# Patient Record
Sex: MALE | Race: ASIAN | NOT HISPANIC OR LATINO | URBAN - METROPOLITAN AREA
[De-identification: names, ages, dates, MRNs, and addresses within clinical notes are randomized per-mention and may not be internally consistent; named-entity substitution may affect disease eponyms.]

---

## 2018-04-06 ENCOUNTER — EMERGENCY (EMERGENCY)
Facility: HOSPITAL | Age: 48
LOS: 1 days | Discharge: HOME | End: 2018-04-06
Attending: EMERGENCY MEDICINE | Admitting: EMERGENCY MEDICINE
Payer: COMMERCIAL

## 2018-04-06 VITALS
SYSTOLIC BLOOD PRESSURE: 137 MMHG | DIASTOLIC BLOOD PRESSURE: 87 MMHG | OXYGEN SATURATION: 100 % | TEMPERATURE: 99 F | RESPIRATION RATE: 16 BRPM | HEART RATE: 86 BPM

## 2018-04-06 LAB
ALBUMIN SERPL ELPH-MCNC: 4.2 G/DL — SIGNIFICANT CHANGE UP (ref 3.3–5)
ALP SERPL-CCNC: 69 U/L — SIGNIFICANT CHANGE UP (ref 40–120)
ALT FLD-CCNC: 23 U/L — SIGNIFICANT CHANGE UP (ref 4–41)
AST SERPL-CCNC: 21 U/L — SIGNIFICANT CHANGE UP (ref 4–40)
BASE EXCESS BLDV CALC-SCNC: 2.4 MMOL/L — SIGNIFICANT CHANGE UP
BASOPHILS # BLD AUTO: 0.02 K/UL — SIGNIFICANT CHANGE UP (ref 0–0.2)
BASOPHILS NFR BLD AUTO: 0.4 % — SIGNIFICANT CHANGE UP (ref 0–2)
BILIRUB SERPL-MCNC: 0.5 MG/DL — SIGNIFICANT CHANGE UP (ref 0.2–1.2)
BLOOD GAS VENOUS - CREATININE: 0.97 MG/DL — SIGNIFICANT CHANGE UP (ref 0.5–1.3)
BUN SERPL-MCNC: 16 MG/DL — SIGNIFICANT CHANGE UP (ref 7–23)
CALCIUM SERPL-MCNC: 9 MG/DL — SIGNIFICANT CHANGE UP (ref 8.4–10.5)
CHLORIDE BLDV-SCNC: 102 MMOL/L — SIGNIFICANT CHANGE UP (ref 96–108)
CHLORIDE SERPL-SCNC: 98 MMOL/L — SIGNIFICANT CHANGE UP (ref 98–107)
CO2 SERPL-SCNC: 25 MMOL/L — SIGNIFICANT CHANGE UP (ref 22–31)
COHGB MFR BLDV: 0.6 % — SIGNIFICANT CHANGE UP (ref 0.5–1.5)
COHGB MFR BLDV: 14.8 G/DL — SIGNIFICANT CHANGE UP (ref 13–17)
CREAT SERPL-MCNC: 1.07 MG/DL — SIGNIFICANT CHANGE UP (ref 0.5–1.3)
EOSINOPHIL # BLD AUTO: 0.07 K/UL — SIGNIFICANT CHANGE UP (ref 0–0.5)
EOSINOPHIL NFR BLD AUTO: 1.5 % — SIGNIFICANT CHANGE UP (ref 0–6)
GAS PNL BLDV: 134 MMOL/L — LOW (ref 136–146)
GLUCOSE BLDV-MCNC: 147 — HIGH (ref 70–99)
GLUCOSE SERPL-MCNC: 153 MG/DL — HIGH (ref 70–99)
HCO3 BLDV-SCNC: 25 MMOL/L — SIGNIFICANT CHANGE UP (ref 20–27)
HCT VFR BLD CALC: 42.3 % — SIGNIFICANT CHANGE UP (ref 39–50)
HCT VFR BLDV CALC: 45.3 % — SIGNIFICANT CHANGE UP (ref 39–51)
HGB BLD-MCNC: 14.2 G/DL — SIGNIFICANT CHANGE UP (ref 13–17)
HGB BLDV-MCNC: 14.8 G/DL — SIGNIFICANT CHANGE UP (ref 13–17)
IMM GRANULOCYTES # BLD AUTO: 0.01 # — SIGNIFICANT CHANGE UP
IMM GRANULOCYTES NFR BLD AUTO: 0.2 % — SIGNIFICANT CHANGE UP (ref 0–1.5)
LACTATE BLDV-MCNC: 1.2 MMOL/L — SIGNIFICANT CHANGE UP (ref 0.5–2)
LYMPHOCYTES # BLD AUTO: 0.93 K/UL — LOW (ref 1–3.3)
LYMPHOCYTES # BLD AUTO: 20.3 % — SIGNIFICANT CHANGE UP (ref 13–44)
MCHC RBC-ENTMCNC: 28.3 PG — SIGNIFICANT CHANGE UP (ref 27–34)
MCHC RBC-ENTMCNC: 33.6 % — SIGNIFICANT CHANGE UP (ref 32–36)
MCV RBC AUTO: 84.3 FL — SIGNIFICANT CHANGE UP (ref 80–100)
METHGB MFR BLDV: 0.6 % — SIGNIFICANT CHANGE UP (ref 0–1.5)
MONOCYTES # BLD AUTO: 0.51 K/UL — SIGNIFICANT CHANGE UP (ref 0–0.9)
MONOCYTES NFR BLD AUTO: 11.1 % — SIGNIFICANT CHANGE UP (ref 2–14)
NEUTROPHILS # BLD AUTO: 3.05 K/UL — SIGNIFICANT CHANGE UP (ref 1.8–7.4)
NEUTROPHILS NFR BLD AUTO: 66.5 % — SIGNIFICANT CHANGE UP (ref 43–77)
NRBC # FLD: 0 — SIGNIFICANT CHANGE UP
OXYHGB MFR BLDV: 54.2 % — LOW (ref 59–84)
PCO2 BLDV: 46 MMHG — SIGNIFICANT CHANGE UP (ref 41–51)
PH BLDV: 7.39 PH — SIGNIFICANT CHANGE UP (ref 7.32–7.43)
PLATELET # BLD AUTO: 180 K/UL — SIGNIFICANT CHANGE UP (ref 150–400)
PMV BLD: 10.1 FL — SIGNIFICANT CHANGE UP (ref 7–13)
PO2 BLDV: 31 MMHG — LOW (ref 35–40)
POTASSIUM BLDV-SCNC: 3.7 MMOL/L — SIGNIFICANT CHANGE UP (ref 3.4–4.5)
POTASSIUM SERPL-MCNC: 3.9 MMOL/L — SIGNIFICANT CHANGE UP (ref 3.5–5.3)
POTASSIUM SERPL-SCNC: 3.9 MMOL/L — SIGNIFICANT CHANGE UP (ref 3.5–5.3)
PROT SERPL-MCNC: 7.7 G/DL — SIGNIFICANT CHANGE UP (ref 6–8.3)
RBC # BLD: 5.02 M/UL — SIGNIFICANT CHANGE UP (ref 4.2–5.8)
RBC # FLD: 11.9 % — SIGNIFICANT CHANGE UP (ref 10.3–14.5)
SAO2 % BLDV: 54.9 % — LOW (ref 60–85)
SODIUM SERPL-SCNC: 137 MMOL/L — SIGNIFICANT CHANGE UP (ref 135–145)
TROPONIN T SERPL-MCNC: < 0.06 NG/ML — SIGNIFICANT CHANGE UP (ref 0–0.06)
WBC # BLD: 4.59 K/UL — SIGNIFICANT CHANGE UP (ref 3.8–10.5)
WBC # FLD AUTO: 4.59 K/UL — SIGNIFICANT CHANGE UP (ref 3.8–10.5)

## 2018-04-06 PROCEDURE — 93010 ELECTROCARDIOGRAM REPORT: CPT | Mod: NC

## 2018-04-06 PROCEDURE — 71046 X-RAY EXAM CHEST 2 VIEWS: CPT | Mod: 26

## 2018-04-06 PROCEDURE — 99285 EMERGENCY DEPT VISIT HI MDM: CPT | Mod: 25

## 2018-04-06 RX ORDER — SODIUM CHLORIDE 9 MG/ML
1000 INJECTION INTRAMUSCULAR; INTRAVENOUS; SUBCUTANEOUS ONCE
Qty: 0 | Refills: 0 | Status: COMPLETED | OUTPATIENT
Start: 2018-04-06 | End: 2018-04-06

## 2018-04-06 RX ORDER — KETOROLAC TROMETHAMINE 30 MG/ML
15 SYRINGE (ML) INJECTION ONCE
Qty: 0 | Refills: 0 | Status: DISCONTINUED | OUTPATIENT
Start: 2018-04-06 | End: 2018-04-06

## 2018-04-06 RX ADMIN — Medication 15 MILLIGRAM(S): at 22:21

## 2018-04-06 RX ADMIN — SODIUM CHLORIDE 1000 MILLILITER(S): 9 INJECTION INTRAMUSCULAR; INTRAVENOUS; SUBCUTANEOUS at 22:21

## 2018-04-06 NOTE — ED ADULT TRIAGE NOTE - CHIEF COMPLAINT QUOTE
c/o SOB, chest discomfort, sore throat, eyes burning, after sanding and painting in the house with closed windows on Tuesday. Was seen at [facility] yesterday and dx with viral infection.

## 2018-04-06 NOTE — ED PROVIDER NOTE - PLAN OF CARE
1. Take Tylenol as directed for pain.   2. Take motrin, 600mg every 6 hours as needed for pain.   3. Follow-up with your primary care doctor or medicine clinic at [phone] in 2-3 days   4. Return immediately for any worsening or concerning symptoms as discussed

## 2018-04-06 NOTE — ED PROVIDER NOTE - PROGRESS NOTE DETAILS
discussed case with tox fellow, who states findings are inconsistent with tox exposure. if anything may be reactive due to exposure to organic material, tx would be supportive care. - Chris Estrada MD pt re-assessed. tolerating PO. discussed findings with patient, importance of follow-up and indications to return to ED. stable for d/c. - Chris Estrada MD

## 2018-04-06 NOTE — ED PROVIDER NOTE - ENMT, MLM
Airway patent, Nasal mucosa clear. +Oral ulcerations. Throat has no vesicles, no oropharyngeal exudates and uvula is midline. Airway patent, Nasal mucosa clear. +Oral ulcerations. Uvula is midline. No anterior cervical lymphadenopathy.

## 2018-04-06 NOTE — ED PROVIDER NOTE - CARE PLAN
Principal Discharge DX:	Viral illness  Assessment and plan of treatment:	1. Take Tylenol as directed for pain.   2. Take motrin, 600mg every 6 hours as needed for pain.   3. Follow-up with your primary care doctor or medicine clinic at [phone] in 2-3 days   4. Return immediately for any worsening or concerning symptoms as discussed

## 2018-04-06 NOTE — ED PROVIDER NOTE - MEDICAL DECISION MAKING DETAILS
47M with HLD presents with itchy eyes, cough, congestion, myalgias, sore throat, most c/w viral syndrome. with oral ulcerations suspicious for coxsackie virus. did have recent exposure to propane and sanded materials in basement, but recent neg workup at [facility]. pt remains very concerned about CO exposure, to obtain carboxyhemoglobin. obtain blood gas, CBC eval for anemia, cmp eval for metabolic derangement. CXR. GIve IVF, toradol. Re-assess. - Chris Estrada MD 47M with HLD presents with itchy eyes, cough, congestion, myalgias, sore throat, most c/w viral syndrome. with oral ulcerations suspicious for coxsackie virus. did have recent exposure to propane and sanded materials in basement, but recent neg workup at [facility]. pt remains very concerned about CO exposure, to obtain carboxyhemoglobin. obtain blood gas, CBC eval for anemia, cmp eval for metabolic derangement. CXR. Due to chest pain check CE x 2, very low suspicion of ACS. Without tachycardia, hypoxia, no LE edema or calf pain, extremely low suspicion of PE. GIve IVF, toradol. Re-assess. - Chris Estrada MD

## 2018-04-06 NOTE — ED PROVIDER NOTE - OBJECTIVE STATEMENT
47M with pmh of HLD presents with cough, eye burning/itching, sore throat, abd pain, chest pain, sob, diarrhea. 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 Did wear mask while sanding but took off when not. Also with kerosene heater in basement. Seen at [facility] yesterday, diagnosed with viral illness and d/c'd home. Pt had ABG performed, with carboxyhemoglobin of 1.4 and methemoglobin of 0.6. Also with CXR performed. +Fever/chills. No nausea/vomiting. 47M with pmh of HLD presents with cough, eye burning/itching, sore throat, abd pain, chest pain, sob, diarrhea. 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 Did wear mask while sanding but took off when not. Also with kerosene heater in basement. Seen at [facility] yesterday, diagnosed with viral illness and d/c'd home. Pt had ABG performed, with carboxyhemoglobin of 1.4 and methemoglobin of 0.6. Also with CXR performed.  Chest pain is a tightness, worse with coughing. +Fever/chills. No nausea/vomiting. Denies hx DVT/PE. Denies LE swelling, calf pain. Denies recent immobilization.

## 2018-04-06 NOTE — ED ADULT NURSE NOTE - CHIEF COMPLAINT QUOTE
c/o SOB, chest discomfort, sore throat, eyes burning, after sanding and painting in the house with closed windows on Tuesday. Was seen at [facility] yesterday and dx with viral infection.

## 2018-04-07 VITALS
RESPIRATION RATE: 16 BRPM | OXYGEN SATURATION: 100 % | TEMPERATURE: 98 F | DIASTOLIC BLOOD PRESSURE: 85 MMHG | HEART RATE: 86 BPM | SYSTOLIC BLOOD PRESSURE: 145 MMHG

## 2018-04-07 LAB — TROPONIN T SERPL-MCNC: < 0.06 NG/ML — SIGNIFICANT CHANGE UP (ref 0–0.06)

## 2018-04-07 RX ORDER — BENZOCAINE AND MENTHOL 5; 1 G/100ML; G/100ML
1 LIQUID ORAL ONCE
Qty: 0 | Refills: 0 | Status: COMPLETED | OUTPATIENT
Start: 2018-04-07 | End: 2018-04-07

## 2018-04-07 RX ADMIN — BENZOCAINE AND MENTHOL 1 LOZENGE: 5; 1 LIQUID ORAL at 02:47

## 2019-12-02 PROBLEM — Z00.00 ENCOUNTER FOR PREVENTIVE HEALTH EXAMINATION: Status: ACTIVE | Noted: 2019-12-02

## 2019-12-16 ENCOUNTER — APPOINTMENT (OUTPATIENT)
Dept: OTOLARYNGOLOGY | Facility: CLINIC | Age: 49
End: 2019-12-16
Payer: COMMERCIAL

## 2019-12-16 VITALS
HEIGHT: 66 IN | BODY MASS INDEX: 21.86 KG/M2 | OXYGEN SATURATION: 98 % | HEART RATE: 65 BPM | SYSTOLIC BLOOD PRESSURE: 120 MMHG | TEMPERATURE: 97.8 F | WEIGHT: 136 LBS | DIASTOLIC BLOOD PRESSURE: 80 MMHG

## 2019-12-16 DIAGNOSIS — Z86.39 PERSONAL HISTORY OF OTHER ENDOCRINE, NUTRITIONAL AND METABOLIC DISEASE: ICD-10-CM

## 2019-12-16 DIAGNOSIS — Z84.89 FAMILY HISTORY OF OTHER SPECIFIED CONDITIONS: ICD-10-CM

## 2019-12-16 DIAGNOSIS — Z78.9 OTHER SPECIFIED HEALTH STATUS: ICD-10-CM

## 2019-12-16 DIAGNOSIS — Z86.79 PERSONAL HISTORY OF OTHER DISEASES OF THE CIRCULATORY SYSTEM: ICD-10-CM

## 2019-12-16 DIAGNOSIS — Z82.49 FAMILY HISTORY OF ISCHEMIC HEART DISEASE AND OTHER DISEASES OF THE CIRCULATORY SYSTEM: ICD-10-CM

## 2019-12-16 DIAGNOSIS — K11.23 CHRONIC SIALOADENITIS: ICD-10-CM

## 2019-12-16 PROCEDURE — 99243 OFF/OP CNSLTJ NEW/EST LOW 30: CPT

## 2019-12-16 RX ORDER — ROSUVASTATIN CALCIUM 20 MG/1
20 TABLET, FILM COATED ORAL
Refills: 0 | Status: ACTIVE | COMMUNITY

## 2019-12-16 RX ORDER — LISINOPRIL 40 MG/1
40 TABLET ORAL
Refills: 0 | Status: ACTIVE | COMMUNITY

## 2019-12-16 RX ORDER — ASPIRIN 81 MG/1
81 TABLET ORAL
Refills: 0 | Status: ACTIVE | COMMUNITY

## 2019-12-16 RX ORDER — METFORMIN HYDROCHLORIDE 500 MG/1
500 TABLET, COATED ORAL
Refills: 0 | Status: ACTIVE | COMMUNITY

## 2019-12-16 NOTE — CONSULT LETTER
[Dear  ___] : Dear  [unfilled], [Consult Letter:] : I had the pleasure of evaluating your patient, [unfilled]. [Consult Closing:] : Thank you very much for allowing me to participate in the care of this patient.  If you have any questions, please do not hesitate to contact me. [Please see my note below.] : Please see my note below. [FreeTextEntry2] : Miguel Bhardwaj MD [Sincerely,] : Sincerely, [FreeTextEntry3] : Aubrey Lopez MD, FACS\par Clinical \par Dept. of Otolaryngology and Head & Neck Surgery\par [facility]\par

## 2019-12-16 NOTE — DATA REVIEWED
[de-identified] : Neck ([facility]) - Bilateral parotid enlargement.  7 mm benign appearing node in R parotid gland.

## 2019-12-16 NOTE — PHYSICAL EXAM
[de-identified] : Very prominent parotid glands bilaterally.  [Midline] : trachea located in midline position [Normal] : no rashes

## 2019-12-16 NOTE — HISTORY OF PRESENT ILLNESS
[de-identified] : 48 y/o M with a h/o bilateral parotid swelling that waxes and wanes over the past few years.  Associated symptoms include tenderness when it swells.  He has had no prior treatment.

## 2019-12-16 NOTE — ASSESSMENT
[FreeTextEntry1] : Will check SSA and SSB.  If negative we will need to consider a CT neck to R/O lymphoepithelial cysts and/or Warthins tumors.

## 2020-01-10 ENCOUNTER — RESULT REVIEW (OUTPATIENT)
Age: 50
End: 2020-01-10

## 2020-01-10 LAB
ENA SS-A AB SER IA-ACNC: <0.2 AL
ENA SS-B AB SER IA-ACNC: <0.2 AL

## 2023-10-01 ENCOUNTER — EMERGENCY (EMERGENCY)
Facility: HOSPITAL | Age: 53
LOS: 1 days | Discharge: HOME | End: 2023-10-01
Attending: EMERGENCY MEDICINE | Admitting: EMERGENCY MEDICINE
Payer: OTHER MISCELLANEOUS

## 2023-10-01 VITALS
HEART RATE: 74 BPM | SYSTOLIC BLOOD PRESSURE: 146 MMHG | TEMPERATURE: 98 F | DIASTOLIC BLOOD PRESSURE: 76 MMHG | OXYGEN SATURATION: 99 % | RESPIRATION RATE: 16 BRPM

## 2023-10-01 VITALS
TEMPERATURE: 98 F | SYSTOLIC BLOOD PRESSURE: 141 MMHG | RESPIRATION RATE: 16 BRPM | HEART RATE: 78 BPM | DIASTOLIC BLOOD PRESSURE: 85 MMHG | OXYGEN SATURATION: 100 %

## 2023-10-01 DIAGNOSIS — Z98.890 OTHER SPECIFIED POSTPROCEDURAL STATES: Chronic | ICD-10-CM

## 2023-10-01 PROCEDURE — 73130 X-RAY EXAM OF HAND: CPT | Mod: 26,LT

## 2023-10-01 PROCEDURE — 99284 EMERGENCY DEPT VISIT MOD MDM: CPT

## 2023-10-01 RX ORDER — MUPIROCIN 20 MG/G
1 OINTMENT TOPICAL
Qty: 1 | Refills: 0
Start: 2023-10-01 | End: 2023-10-10

## 2023-10-01 RX ADMIN — Medication 1 TABLET(S): at 20:51

## 2023-10-01 NOTE — ED PROVIDER NOTE - ATTENDING APP SHARED VISIT CONTRIBUTION OF CARE
Gonfederico: I have seen and examined the patient face to face, have reviewed and addended the HPI, PE and a/p as necessary.     51 yo M R handed with HTN and HLD and DM a/w puncture to L hand with a drill after attempting to drill through a metal sheet while hanging a mirror.   Pt was sent in to ED by urgent care for possible foreign body and hand evaluation.  Pt received tetanus at urgent care.      GEN - NAD; well appearing; A+O x3; non-toxic appearing  L hand with puncture wound over the hypothenar surface on the palmar side with minimal drainage, and minimal swelling.      Xray reviewed with no evidence of radioopaque foreign body.    Treat with augmentin.  Outpt hand follow up for puncture wound. Return precautions for signs of infection.

## 2023-10-01 NOTE — ED PROVIDER NOTE - PATIENT PORTAL LINK FT
You can access the FollowMyHealth Patient Portal offered by [facility] by registering at the following website: [email or web address]. By joining [facility]’s FollowMyHealth portal, you will also be able to view your health information using other applications (apps) compatible with our system.

## 2023-10-01 NOTE — ED PROVIDER NOTE - CLINICAL SUMMARY MEDICAL DECISION MAKING FREE TEXT BOX
51 Y/O R hand dominant male PMH HTN HLD DM states that at approx 130PM today he was using a drill and punctured his L hand on the palmar aspect. Pt states he went to urgent care and was advised there could be an abnormality on his X-rays and to come to the ED for further eval. Plan is X-rays of the hand to eval for foreign body or fx. Pt is well appearing, normal ROM of the digit, no active bleeding.

## 2023-10-01 NOTE — ED ADULT TRIAGE NOTE - CHIEF COMPLAINT QUOTE
Presents from urgent care for hand surgeon consult. Pt w puncture wound to L palm with screw gun while at work. Pt received TDAP at . History of HTN, HLD, DM2.

## 2023-10-01 NOTE — ED PROVIDER NOTE - MUSCULOSKELETAL, MLM
Spine appears normal, range of motion is not limited, no muscle or joint tenderness. Cap refill is <2 seconds.

## 2023-10-01 NOTE — ED ADULT TRIAGE NOTE - NS ED TRIAGE AVPU SCALE
[Fully active, able to carry on all pre-disease performance without restriction] : Status 0 - Fully active, able to carry on all pre-disease performance without restriction [Thin] : thin [Normal] : affect appropriate Alert-The patient is alert, awake and responds to voice. The patient is oriented to time, place, and person. The triage nurse is able to obtain subjective information.

## 2023-10-01 NOTE — ED PROVIDER NOTE - OBJECTIVE STATEMENT
51 Y/O R hand dominant male PMH HTN HLD DM states that at approx 130PM today he was using a drill and punctured his L hand on the palmar aspect. Pt states he went to urgent care and was advised there could be an abnormality on his X-rays and to com 53 Y/O R hand dominant male PMH HTN HLD DM states that at approx 130PM today he was using a drill and punctured his L hand on the palmar aspect. Pt states he went to urgent care and was advised there could be an abnormality on his X-rays and to come to the ED for further eval, possible hand sx assessment. Pt denies numbness or weakness of the hand. Pt states his only PSH was a lymph node removal (states the results were benign.) Pt states he was given a tetanus shot while at urgent care.

## 2023-10-01 NOTE — ED PROVIDER NOTE - NSFOLLOWUPINSTRUCTIONS_ED_ALL_ED_FT
Apply the antibiotic cream two times per day and take the antibiotic pills as prescribed. Follow up with a hand specialist. You can see Dr. Dara An  [address] [place] [address] [address] [facility] [id number] [phone]. Advance activity as tolerated.  Continue all previously prescribed medications as directed.  Follow up with your primary care physician in 48-72 hours- bring copies of your results.  Return to the ER for worsening or persistent symptoms, and/or ANY NEW OR CONCERNING SYMPTOMS. THIS INCLUDES BUT IS NOT LIMITED TO INCREASED REDNESS OR SWELLING, FEVER, CHILLS, NIGHTSWEATS OR FOR ANY OTHER SYMPTOMS THAT CONCERN YOU. If you have issues obtaining follow up, please call: [phone] to obtain a doctor or specialist who takes your insurance in your area.  You may call [phone] to make an appointment with the internal medicine clinic.

## 2024-02-14 NOTE — ED PROVIDER NOTE - NS_EDPROVIDERDISPOUSERTYPE_ED_A_ED
Patient will need Xrays, please place imaging order.  Thank you.   Attending Attestation (For Attendings USE Only)...